# Patient Record
Sex: MALE | Race: WHITE | Employment: UNEMPLOYED | URBAN - METROPOLITAN AREA
[De-identification: names, ages, dates, MRNs, and addresses within clinical notes are randomized per-mention and may not be internally consistent; named-entity substitution may affect disease eponyms.]

---

## 2023-11-03 ENCOUNTER — HOSPITAL ENCOUNTER (EMERGENCY)
Facility: HOSPITAL | Age: 23
Discharge: HOME | End: 2023-11-03
Attending: EMERGENCY MEDICINE

## 2023-11-03 VITALS
RESPIRATION RATE: 20 BRPM | OXYGEN SATURATION: 100 % | HEART RATE: 57 BPM | TEMPERATURE: 98.5 F | DIASTOLIC BLOOD PRESSURE: 70 MMHG | SYSTOLIC BLOOD PRESSURE: 125 MMHG

## 2023-11-03 DIAGNOSIS — N48.9 PENILE LESION: Primary | ICD-10-CM

## 2023-11-03 PROCEDURE — 99283 EMERGENCY DEPT VISIT LOW MDM: CPT

## 2023-11-03 PROCEDURE — 87529 HSV DNA AMP PROBE: CPT

## 2023-11-03 PROCEDURE — 99284 EMERGENCY DEPT VISIT MOD MDM: CPT | Performed by: EMERGENCY MEDICINE

## 2023-11-03 RX ORDER — VALACYCLOVIR HYDROCHLORIDE 500 MG/1
1000 TABLET, FILM COATED ORAL ONCE
Status: COMPLETED | OUTPATIENT
Start: 2023-11-03 | End: 2023-11-03

## 2023-11-03 RX ORDER — VALACYCLOVIR HYDROCHLORIDE 500 MG/1
1000 TABLET, FILM COATED ORAL EVERY 8 HOURS SCHEDULED
Status: DISCONTINUED | OUTPATIENT
Start: 2023-11-03 | End: 2023-11-03

## 2023-11-03 RX ORDER — LIDOCAINE HYDROCHLORIDE 20 MG/ML
JELLY TOPICAL ONCE
Status: COMPLETED | OUTPATIENT
Start: 2023-11-03 | End: 2023-11-03

## 2023-11-03 RX ORDER — ACYCLOVIR 400 MG/1
400 TABLET ORAL EVERY 8 HOURS
Qty: 30 TABLET | Refills: 0 | Status: SHIPPED | OUTPATIENT
Start: 2023-11-03 | End: 2023-11-13

## 2023-11-03 RX ADMIN — VALACYCLOVIR 1000 MG: 500 TABLET, FILM COATED ORAL at 20:06

## 2023-11-03 RX ADMIN — LIDOCAINE HYDROCHLORIDE: 20 JELLY TOPICAL at 20:06

## 2023-11-03 NOTE — ED ATTENDING ATTESTATION
11/3/2023  I, Anupama George MD, saw and evaluated the patient. I have discussed the patient with the resident/non-physician practitioner and agree with the resident's/non-physician practitioner's findings, Plan of Care, and MDM as documented in the resident's/non-physician practitioner's note, except where noted. All available labs and Radiology studies were reviewed. I was present for key portions of any procedure(s) performed by the resident/non-physician practitioner and I was immediately available to provide assistance. At this point I agree with the current assessment done in the Emergency Department. I have conducted an independent evaluation of this patient a history and physical is as follows: Pain to the distal aspect of his penis. Ulceration to the distal aspect. No burning in urination. No penile discharge. HSV swab, will treat empirically as herpes given pain, ulceration to the genital area.     Results Reviewed       Procedure Component Value Units Date/Time    HSV TYPE 1,2 DNA PCR [[id number]]     Lab Status: No result Specimen: Swab               ED Course         Critical Care Time  Procedures

## 2023-11-03 NOTE — ED PROVIDER NOTES
History  Chief Complaint   Patient presents with    Penis Pain     Patient state he has pain in the bottom of the penis near the tip. Patient states this started 2-3 days ago. Patient states he was having intercourse and the penis "may have been bent by a very strong force" per . Patient denies blood in his urine but states that it has a burning sensation at the tip. Denies any discharge. HPI    None       History reviewed. No pertinent past medical history. History reviewed. No pertinent surgical history. History reviewed. No pertinent family history. I have reviewed and agree with the history as documented. E-Cigarette/Vaping     E-Cigarette/Vaping Substances     Social History     Tobacco Use    Smoking status: Former     Packs/day: 0.25     Types: Cigarettes     Passive exposure: Never    Smokeless tobacco: Never   Substance Use Topics    Alcohol use: Yes    Drug use: Not Currently        Review of Systems    Physical Exam  ED Triage Vitals [11/03/23 1743]   Temperature Pulse Respirations Blood Pressure SpO2   98.5 °F (36.9 °C) 57 20 125/70 100 %      Temp Source Heart Rate Source Patient Position - Orthostatic VS BP Location FiO2 (%)   Oral Monitor Lying Right arm --      Pain Score       --             Orthostatic Vital Signs  Vitals:    11/03/23 1743   BP: 125/70   Pulse: 57   Patient Position - Orthostatic VS: Lying       Physical Exam    ED Medications  Medications - No data to display    Diagnostic Studies  Results Reviewed       None                   No orders to display         Procedures  Procedures      ED Course                                       MDM      Disposition  Final diagnoses:   None     ED Disposition       None          Follow-up Information    None         Patient's Medications    No medications on file     No discharge procedures on file. PDMP Review       None             ED Provider  Attending physically available and evaluated Iram Stein.  I managed the patient along with the ED Attending.     Electronically Signed by gel ( Topical Given by Other 11/3/23 2006)   valACYclovir (VALTREX) tablet 1,000 mg (1,000 mg Oral Given by Other 11/3/23 2006)       Diagnostic Studies  Results Reviewed       Procedure Component Value Units Date/Time    HSV TYPE 1,2 DNA PCR [[id number]] Collected: 11/03/23 2012    Lab Status: In process Specimen: Swab from Ventricle, Right Temporal Updated: 11/03/23 2015                   No orders to display         Procedures  Procedures      ED Course                                       Medical Decision Making  68-year-old male presents the emergency department for evaluation of pain around the base of the glans x3 days. Differential diagnosis includes but is not limited to: Herpes infection, friction injury, Behcet's    Physical exam is notable for ulcerative lesions around the base of the glans. Nonvesicular. No purulence or bleeding. Extremely tender to the touch. Did swab the patient for herpes simplex infection. He is aware the results will not come back for a couple of days. He is given information on how to set up MyChart. For pain, he is given a Uro-Jet, and started on Valtrex. He understands if the test comes back negative, he can discontinue this medication. He is given a referral for a PCP as he does not have one. Patient discharged home in stable condition. Amount and/or Complexity of Data Reviewed  Labs: ordered. Risk  Prescription drug management. Disposition  Final diagnoses:   Penile lesion     Time reflects when diagnosis was documented in both MDM as applicable and the Disposition within this note       Time User Action Codes Description Comment    11/3/2023  7:14 PM Linette Salgado [N48.9] Penile lesion           ED Disposition       ED Disposition   Discharge    Condition   Stable    Date/Time   Fri Nov 3, 2023  7:17 PM    Comment   Delane Cogan discharge to home/self care.                    Follow-up Information       Follow up With Specialties Details Why Contact Info Additional Information    [facility] - [address] [place]   [address] [place] [address] Road [id number]  [facility], [place], [address] [place] [id number]             Discharge Medication List as of 11/3/2023  7:21 PM        START taking these medications    Details   acyclovir (ZOVIRAX) 400 MG tablet Take 1 tablet (400 mg total) by mouth every 8 (eight) hours for 10 days For shingles, Starting Fri 11/3/2023, Until Mon 11/13/2023, Normal               PDMP Review       None             ED Provider  Attending physically available and evaluated Ashanti Neil. I managed the patient along with the ED Attending.     Electronically Signed by           Steve Freeman MD  11/07/23 [id number]       Steve Freeman MD  11/07/23 3744

## 2023-11-03 NOTE — DISCHARGE INSTRUCTIONS
Lillian esta medicamento, acyclovir, cada 8 horas por 10 coronado    INSTRUCCIONES SOBRE EL [facility]:   Regrese a la faisal de emergencias si:  Usted tiene cambios en mancia visión o en la apariencia de mancia ortiz. Usted tiene [employer] ojos cuando joana a la rebecca. Le salen úlceras en los ojos. Tiene dolor de archie severo o se siente confundido.

## 2023-11-07 LAB
HSV1 DNA SPEC QL NAA+PROBE: NEGATIVE
HSV2 DNA SPEC QL NAA+PROBE: POSITIVE